# Patient Record
Sex: FEMALE | Race: WHITE
[De-identification: names, ages, dates, MRNs, and addresses within clinical notes are randomized per-mention and may not be internally consistent; named-entity substitution may affect disease eponyms.]

---

## 2017-10-17 ENCOUNTER — HOSPITAL ENCOUNTER (EMERGENCY)
Dept: HOSPITAL 80 - FED | Age: 77
Discharge: HOME | End: 2017-10-17
Payer: COMMERCIAL

## 2017-10-17 VITALS
HEART RATE: 107 BPM | RESPIRATION RATE: 20 BRPM | SYSTOLIC BLOOD PRESSURE: 130 MMHG | DIASTOLIC BLOOD PRESSURE: 60 MMHG | OXYGEN SATURATION: 88 %

## 2017-10-17 VITALS — TEMPERATURE: 98.4 F

## 2017-10-17 DIAGNOSIS — Z79.82: ICD-10-CM

## 2017-10-17 DIAGNOSIS — E87.1: Primary | ICD-10-CM

## 2017-10-17 DIAGNOSIS — E86.9: ICD-10-CM

## 2017-10-17 DIAGNOSIS — I10: ICD-10-CM

## 2017-10-17 DIAGNOSIS — Z87.891: ICD-10-CM

## 2017-10-17 DIAGNOSIS — E86.0: ICD-10-CM

## 2017-10-17 DIAGNOSIS — Z85.3: ICD-10-CM

## 2017-10-17 LAB
% IMMATURE GRANULYOCYTES: 0.2 % (ref 0–1.1)
ABSOLUTE IMMATURE GRANULOCYTES: 0.01 10^3/UL (ref 0–0.1)
ABSOLUTE NRBC COUNT: 0 10^3/UL (ref 0–0.01)
ADD DIFF?: NO
ADD MORPH?: NO
ADD SCAN?: NO
ALBUMIN SERPL-MCNC: 3.3 G/DL (ref 3.5–5)
ALP SERPL-CCNC: 95 IU/L (ref 38–126)
ALT SERPL-CCNC: 94 IU/L (ref 9–52)
ANION GAP SERPL CALC-SCNC: 8 MEQ/L (ref 8–16)
AST SERPL-CCNC: 81 IU/L (ref 14–46)
ATYPICAL LYMPHOCYTE FLAG: 0 (ref 0–99)
BILIRUB SERPL-MCNC: 1 MG/DL (ref 0.1–1.4)
BILIRUBIN-CONJUGATED: 0.4 MG/DL (ref 0–0.5)
BILIRUBIN-UNCONJUGATED: 0.6 MG/DL (ref 0–1.1)
CALCIUM SERPL-MCNC: 9.4 MG/DL (ref 8.5–10.4)
CHLORIDE SERPL-SCNC: 94 MEQ/L (ref 97–110)
CO2 SERPL-SCNC: 26 MEQ/L (ref 22–31)
CREAT SERPL-MCNC: 1 MG/DL (ref 0.6–1)
ERYTHROCYTE [DISTWIDTH] IN BLOOD BY AUTOMATED COUNT: 12.8 % (ref 11.5–15.2)
FRAGMENT RBC FLAG: 0 (ref 0–99)
GFR SERPL CREATININE-BSD FRML MDRD: 54 ML/MIN/{1.73_M2}
GLUCOSE SERPL-MCNC: 118 MG/DL (ref 70–100)
HCT VFR BLD CALC: 42.1 % (ref 38–47)
HGB BLD-MCNC: 14.8 G/DL (ref 12.6–16.3)
LEFT SHIFT FLG: 0 (ref 0–99)
LIPEMIA HEMOLYSIS FLAG: 90 (ref 0–99)
MCH RBC BLDCO QN: 31.2 PG (ref 27.9–34.1)
MCHC RBC AUTO-ENTMCNC: 35.2 G/DL (ref 32.4–36.7)
MCV RBC AUTO: 88.8 FL (ref 81.5–99.8)
MUCOUS THREADS #/AREA URNS LPF: (no result) /LPF
NRBC-AUTO%: 0 % (ref 0–0.2)
PLATELET # BLD: 222 10^3/UL (ref 150–400)
PLATELET CLUMPS FLAG: 0 (ref 0–99)
PMV BLD AUTO: 10 FL (ref 8.7–11.7)
POTASSIUM SERPL-SCNC: 3.6 MEQ/L (ref 3.5–5.2)
PROT SERPL-MCNC: 6.2 G/DL (ref 6.3–8.2)
RBC # BLD AUTO: 4.74 10^6/UL (ref 4.18–5.33)
RBC #/AREA URNS HPF: (no result) /HPF (ref 0–3)
SODIUM SERPL-SCNC: 128 MEQ/L (ref 134–144)
WBC #/AREA URNS HPF: (no result) /HPF (ref 0–3)

## 2017-10-17 PROCEDURE — 96374 THER/PROPH/DIAG INJ IV PUSH: CPT

## 2017-10-17 PROCEDURE — 99284 EMERGENCY DEPT VISIT MOD MDM: CPT

## 2017-10-17 NOTE — EDPHY
H & P


Stated Complaint: Nausea, abdo pain, diarrhea and rash - Possible reaction to 

chemo med.


Time Seen by Provider: 10/17/17 12:09


HPI/ROS: 





CHIEF COMPLAINT:  Weakness, loss of appetite, history of metastatic breast 

cancer





HISTORY OF PRESENT ILLNESS:  The patient presents to the ED with worsening 

weakness, loss of appetite, intermittent diarrhea and constipation.  The 

patient is currently receiving oral chemotherapy for metastatic breast cancer.  

The patient is on Atenafor and Examestone.  Additionally she takes losartan, 

Synthroid and Prilosec.  The patient has been on her oral chemotherapy for 

approximately the past 6 weeks.  She recently return to [place] from [place] 

where she did not have medical care.  She reportedly had a fever last night to 

102 degrees.  The patient denies acute cough.  She complains of generalized 

weakness and loss of appetite as her primary presenting symptom today.





REVIEW OF SYSTEMS:


A comprehensive 10 point review of systems is otherwise negative aside from 

elements mentioned in the history of present illness.


Source: Patient


Exam Limitations: No limitations





- Personal History


Current Tetanus Diphtheria and Acellular Pertussis (TDAP): Unsure





- Medical/Surgical History


Hx Asthma: No


Hx Chronic Respiratory Disease: No


Hx Diabetes: No


Hx Cardiac Disease: No


Hx Renal Disease: No


Hx Cirrhosis: No


Hx Alcoholism: No


Hx HIV/AIDS: No


Hx Splenectomy or Spleen Trauma: No


Other PMH: HTN, Hysterectomy, breast augmentation - Breast CA





- Social History


Smoking Status: Former smoker





- Physical Exam


Exam: 





General Appearance:  Alert, no distress


Eyes:  Pupils equal and round no pallor or injection


ENT, Mouth:  Mucous membranes moist


Respiratory:  There are no retractions, lungs are clear to auscultation


Cardiovascular:  Regular rate and rhythm


Gastrointestinal:  Abdomen is soft and nontender, no masses, bowel sounds normal


Neurological:  A&O, normal motor function, normal sensory exam, normal cranial 

nerves


Skin:  Warm and dry, no rashes


Musculoskeletal:  Neck is supple nontender


Extremities:  symmetrical, full range of motion








Constitutional: 


 Initial Vital Signs











Temperature (C)  36.9 C   10/17/17 11:42


 


Heart Rate  118 H  10/17/17 11:42


 


Respiratory Rate  18   10/17/17 11:42


 


Blood Pressure  128/82 H  10/17/17 11:42


 


O2 Sat (%)  92   10/17/17 11:42








 











O2 Delivery Mode               Room Air














Allergies/Adverse Reactions: 


 





iopamidol [From Isovue-M] Allergy (Severe, Verified 08/20/15 18:04)


 Anaphylaxis


lisinopril [Lisinopril] Allergy (Severe, Verified 08/20/15 18:04)


 Swelling/neck,face,throat


azithromycin [From Zithromax Z-Tomas] Allergy (Intermediate, Verified 08/20/15 18:

04)


 Rash


codeine [Codeine] Allergy (Intermediate, Verified 08/20/15 18:04)


 Vomiting


erythromycin base [Erythromycin Base] Allergy (Intermediate, Verified 08/20/15 

18:04)


 Other-Enter Comments


meperidine HCl [From Demerol] Allergy (Intermediate, Verified 08/20/15 18:04)


 Vomiting


Penicillins Allergy (Intermediate, Verified 08/20/15 18:04)


 Hives








Home Medications: 














 Medication  Instructions  Recorded


 


Ascorbic Acid [Vitamin C 500 mg 1,000 mg PO DAILY 10/21/14





(*)]  


 


Aspirin [Aspirin 81mg (*)] 81 mg PO DAILY 10/21/14


 


Fexofenadine HCl [Allegra Allergy] 60 mg PO DAILY PRN 10/21/14


 


Herbals/Supplements -Info Only 1 ea PO DAILY 10/21/14


 


Levothyroxine [Synthroid 112 mcg 112 mcg PO DAILY06 10/21/14





(*)]  


 


Magnesium Oxide [Magnesium Oxide 200 mg PO DAILY 10/21/14





400 mg (*)]  


 


Multivitamins [Multivitamin (*)] 1 each PO DAILY 10/21/14


 


Omega-3 Fatty Acids [Fish Oil 1000 1,000 mg PO DAILY 10/21/14





mg (*)]  


 


Calcium Carbonate [Oyster Shell 500 mg PO DAILY 08/20/15





Calcium 500 mg (*)]  


 


Cholecalciferol (Vitamin D3) 400 unit PO DAILY 08/20/15





[Delta D3]  


 


Ibuprofen [Motrin (*)] 600 mg PO TID PRN 08/20/15


 


Letrozole [Femara 2.5 mg (*)] 2.5 mg PO DAILY 08/20/15


 


Losartan Potassium [Cozaar] 100 mg PO DAILY 08/20/15


 


Ondansetron Odt [Zofran Odt] 4 mg PO Q4PRN PRN #20 tab 10/17/17














Medical Decision Making


ED Course/Re-evaluation: 





I reviewed the patient's past medical records.  She presents to the ED with 

symptoms consistent with dehydration with fatigue and loss of appetite.  The 

patient did report a fever yesterday to 102 degrees.  She is afebrile in the 

emergency department today.  The patient's vital signs are stable.  She is not 

hypoxemic.  She has no acute respiratory complaints.  The patient's CBC is 

within normal limits without evidence of neutropenia.  The patient did have 

blood cultures x2 obtained.  She was noted to have mild hyponatremia consistent 

with her clinical dehydration.





The patient had an IV established.  She received 2 L of normal saline and 4 mg 

of IV Zofran.





I re-evaluated the patient at 1:50 p.m..  She is feeling better.  We are still 

awaiting urinalysis.  The patient's venous lactate is normal.





Patient's urinalysis is obtained demonstrates no evidence of an infection.





I spoke with her oncologist at the [facility].  He recommends 

having her withhold her oral chemo medications.  They will follow up with her 

later this week.  The patient will be discharged home with customary aftercare 

instructions and return precautions.








Differential Diagnosis: 





Differential diagnosis considered includes neutropenic fever, dehydration, 

metabolic abnormality, urinary tract infection, medication side effect





- Data Points


Laboratory Results: 


 Laboratory Results





 10/17/17 12:35 





 10/17/17 12:35 





 











  10/17/17 10/17/17 10/17/17





  14:20 12:35 12:35


 


WBC    6.09 10^3/uL 10^3/uL  





    (3.80-9.50)  


 


RBC    4.74 10^6/uL 10^6/uL  





    (4.18-5.33)  


 


Hgb    14.8 g/dL g/dL  





    (12.6-16.3)  


 


Hct    42.1 % %  





    (38.0-47.0)  


 


MCV    88.8 fL fL  





    (81.5-99.8)  


 


MCH    31.2 pg pg  





    (27.9-34.1)  


 


MCHC    35.2 g/dL g/dL  





    (32.4-36.7)  


 


RDW    12.8 % %  





    (11.5-15.2)  


 


Plt Count    222 10^3/uL 10^3/uL  





    (150-400)  


 


MPV    10.0 fL fL  





    (8.7-11.7)  


 


Neut % (Auto)    63.3 % %  





    (39.3-74.2)  


 


Lymph % (Auto)    19.5 % %  





    (15.0-45.0)  


 


Mono % (Auto)    11.5 % %  





    (4.5-13.0)  


 


Eos % (Auto)    4.4 % %  





    (0.6-7.6)  


 


Baso % (Auto)    1.1 % %  





    (0.3-1.7)  


 


Nucleat RBC Rel Count    0.0 % %  





    (0.0-0.2)  


 


Absolute Neuts (auto)    3.85 10^3/uL 10^3/uL  





    (1.70-6.50)  


 


Absolute Lymphs (auto)    1.19 10^3/uL 10^3/uL  





    (1.00-3.00)  


 


Absolute Monos (auto)    0.70 10^3/uL 10^3/uL  





    (0.30-0.80)  


 


Absolute Eos (auto)    0.27 10^3/uL 10^3/uL  





    (0.03-0.40)  


 


Absolute Basos (auto)    0.07 10^3/uL 10^3/uL  





    (0.02-0.10)  


 


Absolute Nucleated RBC    0.00 10^3/uL 10^3/uL  





    (0-0.01)  


 


Immature Gran %    0.2 % %  





    (0.0-1.1)  


 


Immature Gran #    0.01 10^3/uL 10^3/uL  





    (0.00-0.10)  


 


VBG Lactic Acid      





    


 


Sodium      128 mEq/L L mEq/L





     (134-144) 


 


Potassium      3.6 mEq/L mEq/L





     (3.5-5.2) 


 


Chloride      94 mEq/L L mEq/L





     () 


 


Carbon Dioxide      26 mEq/l mEq/l





     (22-31) 


 


Anion Gap      8 mEq/L mEq/L





     (8-16) 


 


BUN      11 mg/dL mg/dL





     (7-23) 


 


Creatinine      1.0 mg/dL mg/dL





     (0.6-1.0) 


 


Estimated GFR      54 





    


 


Glucose      118 mg/dL H mg/dL





     () 


 


Calcium      9.4 mg/dL mg/dL





     (8.5-10.4) 


 


Total Bilirubin      1.0 mg/dL mg/dL





     (0.1-1.4) 


 


Conjugated Bilirubin      0.4 mg/dL mg/dL





     (0.0-0.5) 


 


Unconjugated Bilirubin      0.6 mg/dL mg/dL





     (0.0-1.1) 


 


AST      81 IU/L H IU/L





     (14-46) 


 


ALT      94 IU/L H IU/L





     (9-52) 


 


Alkaline Phosphatase      95 IU/L IU/L





     () 


 


Total Protein      6.2 g/dL L g/dL





     (6.3-8.2) 


 


Albumin      3.3 g/dL L g/dL





     (3.5-5.0) 


 


Urine RBC  25-50 /hpf H /hpf    





   (0-3)   


 


Urine WBC  1-3 /hpf /hpf    





   (0-3)   


 


Ur Epithelial Cells  NONE SEEN /lpf /lpf    





   (NONE-1+)   


 


Urine Mucus  TRACE /lpf /lpf    





   (NONE-1+)   














  10/17/17





  12:33


 


WBC  





  


 


RBC  





  


 


Hgb  





  


 


Hct  





  


 


MCV  





  


 


MCH  





  


 


MCHC  





  


 


RDW  





  


 


Plt Count  





  


 


MPV  





  


 


Neut % (Auto)  





  


 


Lymph % (Auto)  





  


 


Mono % (Auto)  





  


 


Eos % (Auto)  





  


 


Baso % (Auto)  





  


 


Nucleat RBC Rel Count  





  


 


Absolute Neuts (auto)  





  


 


Absolute Lymphs (auto)  





  


 


Absolute Monos (auto)  





  


 


Absolute Eos (auto)  





  


 


Absolute Basos (auto)  





  


 


Absolute Nucleated RBC  





  


 


Immature Gran %  





  


 


Immature Gran #  





  


 


VBG Lactic Acid  1.4 mmol/L mmol/L





   (0.7-2.1) 


 


Sodium  





  


 


Potassium  





  


 


Chloride  





  


 


Carbon Dioxide  





  


 


Anion Gap  





  


 


BUN  





  


 


Creatinine  





  


 


Estimated GFR  





  


 


Glucose  





  


 


Calcium  





  


 


Total Bilirubin  





  


 


Conjugated Bilirubin  





  


 


Unconjugated Bilirubin  





  


 


AST  





  


 


ALT  





  


 


Alkaline Phosphatase  





  


 


Total Protein  





  


 


Albumin  





  


 


Urine RBC  





  


 


Urine WBC  





  


 


Ur Epithelial Cells  





  


 


Urine Mucus  





  











Medications Given: 


 








Discontinued Medications





Sodium Chloride (Ns)  1,000 mls @ 0 mls/hr IV ONCE ONE; Wide Open


   PRN Reason: Protocol


   Stop: 10/17/17 12:21


   Last Admin: 10/17/17 12:40 Dose:  1,000 mls


Ondansetron HCl (Zofran)  4 mg IVP EDNOW ONE


   Stop: 10/17/17 12:36


   Last Admin: 10/17/17 12:40 Dose:  4 mg








Departure





- Departure


Disposition: Home, Routine, Self-Care


Clinical Impression: 


 BREAST CANCER, BILATERAL, Hyponatremia, Dehydration





Condition: Good


Instructions:  Dehydration (ED)


Additional Instructions: 


1.  Zofran as needed for nausea.


2. Return to the ED for any worsening symptoms, high fever, acute pain or other 

concerns.


3. Your oncologist does recommend holding her oral chemotherapy medications.  

Please contact their office to discuss resuming these medications.








Referrals: 


Jaziel Oliveira MD [Primary Care Provider] - As per Instructions

## 2017-10-24 ENCOUNTER — HOSPITAL ENCOUNTER (OUTPATIENT)
Dept: HOSPITAL 80 - FED | Age: 77
Setting detail: OBSERVATION
LOS: 1 days | Discharge: HOME | End: 2017-10-25
Attending: INTERNAL MEDICINE | Admitting: INTERNAL MEDICINE
Payer: COMMERCIAL

## 2017-10-24 DIAGNOSIS — T45.1X5D: ICD-10-CM

## 2017-10-24 DIAGNOSIS — J90: ICD-10-CM

## 2017-10-24 DIAGNOSIS — C50.919: ICD-10-CM

## 2017-10-24 DIAGNOSIS — E86.0: ICD-10-CM

## 2017-10-24 DIAGNOSIS — Z82.49: ICD-10-CM

## 2017-10-24 DIAGNOSIS — Z88.0: ICD-10-CM

## 2017-10-24 DIAGNOSIS — R53.83: Primary | ICD-10-CM

## 2017-10-24 DIAGNOSIS — R74.0: ICD-10-CM

## 2017-10-24 DIAGNOSIS — E87.1: ICD-10-CM

## 2017-10-24 DIAGNOSIS — E03.9: ICD-10-CM

## 2017-10-24 DIAGNOSIS — Z91.041: ICD-10-CM

## 2017-10-24 DIAGNOSIS — Z87.891: ICD-10-CM

## 2017-10-24 DIAGNOSIS — J96.01: ICD-10-CM

## 2017-10-24 DIAGNOSIS — I10: ICD-10-CM

## 2017-10-24 DIAGNOSIS — R79.9: ICD-10-CM

## 2017-10-24 LAB
% IMMATURE GRANULYOCYTES: 0.9 % (ref 0–1.1)
ABSOLUTE IMMATURE GRANULOCYTES: 0.06 10^3/UL (ref 0–0.1)
ABSOLUTE NRBC COUNT: 0.11 10^3/UL (ref 0–0.01)
ADD DIFF?: NO
ADD MORPH?: YES
ADD SCAN?: NO
ALBUMIN SERPL-MCNC: 3 G/DL (ref 3.5–5)
ALP SERPL-CCNC: 98 IU/L (ref 38–126)
ALT SERPL-CCNC: 64 IU/L (ref 9–52)
ANION GAP SERPL CALC-SCNC: 10 MEQ/L (ref 8–16)
AST SERPL-CCNC: 53 IU/L (ref 14–46)
ATYPICAL LYMPHOCYTE FLAG: 0 (ref 0–99)
BILIRUB SERPL-MCNC: 1.2 MG/DL (ref 0.1–1.4)
CALCIUM SERPL-MCNC: 8.4 MG/DL (ref 8.5–10.4)
CHLORIDE SERPL-SCNC: 91 MEQ/L (ref 97–110)
CO2 SERPL-SCNC: 27 MEQ/L (ref 22–31)
CREAT SERPL-MCNC: 0.9 MG/DL (ref 0.6–1)
ERYTHROCYTE [DISTWIDTH] IN BLOOD BY AUTOMATED COUNT: 13.2 % (ref 11.5–15.2)
FRAGMENT RBC FLAG: 0 (ref 0–99)
GFR SERPL CREATININE-BSD FRML MDRD: > 60 ML/MIN/{1.73_M2}
GLUCOSE SERPL-MCNC: 132 MG/DL (ref 70–100)
HCT VFR BLD CALC: 36.2 % (ref 38–47)
HGB BLD-MCNC: 12.9 G/DL (ref 12.6–16.3)
LEFT SHIFT FLG: 0 (ref 0–99)
LIPEMIA HEMOLYSIS FLAG: 90 (ref 0–99)
MCH RBC BLDCO QN: 31.1 PG (ref 27.9–34.1)
MCHC RBC AUTO-ENTMCNC: 35.6 G/DL (ref 32.4–36.7)
MCV RBC AUTO: 87.2 FL (ref 81.5–99.8)
NRBC-AUTO%: 1.7 % (ref 0–0.2)
PLATELET # BLD: 289 10^3/UL (ref 150–400)
PLATELET CLUMPS FLAG: 0 (ref 0–99)
PMV BLD AUTO: 9.8 FL (ref 8.7–11.7)
POTASSIUM SERPL-SCNC: 3.4 MEQ/L (ref 3.5–5.2)
PROT SERPL-MCNC: 5.2 G/DL (ref 6.3–8.2)
RBC # BLD AUTO: 4.15 10^6/UL (ref 4.18–5.33)
SODIUM SERPL-SCNC: 128 MEQ/L (ref 134–144)

## 2017-10-24 PROCEDURE — G0378 HOSPITAL OBSERVATION PER HR: HCPCS

## 2017-10-24 PROCEDURE — A9540 TC99M MAA: HCPCS

## 2017-10-24 PROCEDURE — 93005 ELECTROCARDIOGRAM TRACING: CPT

## 2017-10-24 PROCEDURE — 78582 LUNG VENTILAT&PERFUS IMAGING: CPT

## 2017-10-24 PROCEDURE — A9558 XE133 XENON 10MCI: HCPCS

## 2017-10-24 PROCEDURE — 99285 EMERGENCY DEPT VISIT HI MDM: CPT

## 2017-10-24 PROCEDURE — 71020: CPT

## 2017-10-24 PROCEDURE — 93970 EXTREMITY STUDY: CPT

## 2017-10-24 PROCEDURE — 93306 TTE W/DOPPLER COMPLETE: CPT

## 2017-10-24 NOTE — CPEKG
Heart Rate: 97

RR Interval: 619

P-R Interval: 144

QRSD Interval: 86

QT Interval: 348

QTC Interval: 442

P Axis: 77

QRS Axis: 55

T Wave Axis: 39

EKG Severity - BORDERLINE ECG -

EKG Impression: SINUS RHYTHM

EKG Impression: LOW VOLTAGE THROUGHOUT

EKG Impression: BORDERLINE T ABNORMALITIES, ANTERIOR LEADS

Electronically Signed By: Anjel Garcia 25-Oct-2017 06:24:23

## 2017-10-25 VITALS — SYSTOLIC BLOOD PRESSURE: 118 MMHG | TEMPERATURE: 98.6 F | DIASTOLIC BLOOD PRESSURE: 80 MMHG

## 2017-10-25 VITALS — RESPIRATION RATE: 20 BRPM

## 2017-10-25 VITALS — HEART RATE: 84 BPM | OXYGEN SATURATION: 95 %

## 2017-10-25 LAB
% IMMATURE GRANULYOCYTES: 1 % (ref 0–1.1)
ABSOLUTE IMMATURE GRANULOCYTES: 0.06 10^3/UL (ref 0–0.1)
ABSOLUTE NRBC COUNT: 0.1 10^3/UL (ref 0–0.01)
ADD DIFF?: NO
ADD MORPH?: YES
ADD SCAN?: NO
ANION GAP SERPL CALC-SCNC: 9 MEQ/L (ref 8–16)
ATYPICAL LYMPHOCYTE FLAG: 0 (ref 0–99)
CALCIUM SERPL-MCNC: 8.4 MG/DL (ref 8.5–10.4)
CHLORIDE SERPL-SCNC: 95 MEQ/L (ref 97–110)
CO2 SERPL-SCNC: 28 MEQ/L (ref 22–31)
CREAT SERPL-MCNC: 0.9 MG/DL (ref 0.6–1)
ERYTHROCYTE [DISTWIDTH] IN BLOOD BY AUTOMATED COUNT: 13.2 % (ref 11.5–15.2)
FRAGMENT RBC FLAG: 0 (ref 0–99)
GFR SERPL CREATININE-BSD FRML MDRD: > 60 ML/MIN/{1.73_M2}
GLUCOSE SERPL-MCNC: 98 MG/DL (ref 70–100)
HCT VFR BLD CALC: 35.2 % (ref 38–47)
HGB BLD-MCNC: 12.3 G/DL (ref 12.6–16.3)
LEFT SHIFT FLG: 0 (ref 0–99)
LIPEMIA HEMOLYSIS FLAG: 90 (ref 0–99)
MCH RBC BLDCO QN: 30.9 PG (ref 27.9–34.1)
MCHC RBC AUTO-ENTMCNC: 34.9 G/DL (ref 32.4–36.7)
MCV RBC AUTO: 88.4 FL (ref 81.5–99.8)
MICROCYTES: (no result)
NRBC-AUTO%: 1.7 % (ref 0–0.2)
PLATELET # BLD EST: ADEQUATE 10*3/UL
PLATELET # BLD EST: ADEQUATE 10*3/UL
PLATELET # BLD: 266 10^3/UL (ref 150–400)
PLATELET CLUMPS FLAG: 20 (ref 0–99)
PMV BLD AUTO: 9.8 FL (ref 8.7–11.7)
POLYCHROMASIA BLD QL SMEAR: (no result)
POLYCHROMASIA BLD QL SMEAR: (no result)
POTASSIUM SERPL-SCNC: 4.2 MEQ/L (ref 3.5–5.2)
RBC # BLD AUTO: 3.98 10^6/UL (ref 4.18–5.33)
SODIUM SERPL-SCNC: 132 MEQ/L (ref 134–144)
TROPONIN I SERPL-MCNC: 0.06 NG/ML (ref 0–0.03)
TROPONIN I SERPL-MCNC: 0.06 NG/ML (ref 0–0.03)

## 2017-10-25 NOTE — PDGENHP
History and Physical





- Chief Complaint


Fatigue





- History of Present Illness


76 yo F w/ metastatic breast CA, hypothyroid, and HTN presents with fatigue and 

SOB. Patient states she started two new medications for breast CA (Afinitor and 

Exemestane) about 2 months ago. One month ago she developed fatigue and stomach 

pain. As a  result, the Afinitor was stopped 1 week ago as this was felt to be 

a drug side effect. During the last week she has continued to feel fatigued and 

also has began to notice intermittent shortness of breath improved by 

albuterol. It was the progressive fatigue that led her to come to the ED today. 

She denies chest pain, fevers, and chills. She has continued to have poor PO 

intake.





History Information





- Allergies/Home Medication List


Allergies/Adverse Reactions: 








iopamidol [From Isovue-M] Allergy (Severe, Verified 10/24/17 23:01)


 Anaphylaxis


lisinopril [Lisinopril] Allergy (Severe, Verified 10/24/17 23:01)


 Swelling/neck,face,throat


azithromycin [From Zithromax Z-Tomas] Allergy (Intermediate, Verified 10/24/17 23:

01)


 Rash


codeine [Codeine] Allergy (Intermediate, Verified 10/24/17 23:01)


 Vomiting


erythromycin base [Erythromycin Base] Allergy (Intermediate, Verified 10/24/17 

23:01)


 Other-Enter Comments


meperidine HCl [From Demerol] Allergy (Intermediate, Verified 10/24/17 23:01)


 Vomiting


Penicillins Allergy (Intermediate, Verified 10/24/17 23:01)


 Hives


Iodine and Iodide Containing Produc Allergy (Verified 10/24/17 23:02)


 





Home Medications: 








Ascorbic Acid [Vitamin C 500 mg (*)] 1,000 mg PO DAILY 10/21/14 [Last Taken 08/

20/15]


Aspirin [Aspirin 81mg (*)] 81 mg PO DAILY 10/21/14 [Last Taken 08/19/15]


Fexofenadine HCl [Allegra Allergy] 60 mg PO DAILY PRN 10/21/14 [Last Taken 08/20

/15]


Herbals/Supplements -Info Only 1 ea PO DAILY 10/21/14 [Last Taken 10/27/14]


Levothyroxine [Synthroid 112 mcg (*)] 112 mcg PO DAILY06 10/21/14 [Last Taken 08

/20/15]


Magnesium Oxide [Magnesium Oxide 400 mg (*)] 200 mg PO DAILY 10/21/14 [Last 

Taken 08/19/15]


Multivitamins [Multivitamin (*)] 1 each PO DAILY 10/21/14 [Last Taken 08/20/15]


Omega-3 Fatty Acids [Fish Oil 1000 mg (*)] 1,000 mg PO DAILY 10/21/14 [Last 

Taken 08/20/15]


Calcium Carbonate [Oyster Shell Calcium 500 mg (*)] 500 mg PO DAILY 08/20/15 [

Last Taken 08/19/15]


Cholecalciferol (Vitamin D3) [Delta D3] 400 unit PO DAILY 08/20/15 [Last Taken 

08/19/15]


Ibuprofen [Motrin (*)] 600 mg PO TID PRN 08/20/15 [Last Taken Unknown]


Letrozole [Femara 2.5 mg (*)] 2.5 mg PO DAILY 08/20/15 [Last Taken 08/20/15]


Losartan Potassium [Cozaar] 100 mg PO DAILY 08/20/15 [Last Taken 08/20/15]





I have personally reviewed and updated: family history, medical history





- Past Medical History


cancer (Metastatic breast (bone, liver)), hypertension





- Family History


Positive for: CAD


Additional family history: UC





- Social History


Smoking Status: Former smoker





Review of Systems


Review of Systems: 





ROS: 10pt was reviewed & negative except for what was stated in HPI & below





Physical Exam


Physical Exam: 

















Temp Pulse Resp BP Pulse Ox


 


 37.9 C   90   22 H  132/84 H  95 


 


 10/24/17 22:57  10/25/17 01:28  10/25/17 01:28  10/25/17 01:28  10/25/17 01:28




















O2 (L/minute)                  2














Constitutional: no apparent distress, appears nourished


Eyes: PERRL, EOMI


Ears, Nose, Mouth, Throat: moist mucous membranes, no oral mucosal ulcers


Cardiovascular: regular rate and rhythym, no murmur, rub, or gallop


Respiratory: no respiratory distress, clear to auscultation


Skin: warm, normal color


Musculoskeletal: full muscle strength, no muscle tenderness


Neurologic: AAOx3, CN II-XII Intact


Psychiatric: interacting appropriately, not anxious





Lab Data & Imaging Review





 10/24/17 23:33





 10/24/17 23:33














WBC  6.51 10^3/uL (3.80-9.50)   10/24/17  23:33    


 


RBC  4.15 10^6/uL (4.18-5.33)  L  10/24/17  23:33    


 


Hgb  12.9 g/dL (12.6-16.3)   10/24/17  23:33    


 


Hct  36.2 % (38.0-47.0)  L  10/24/17  23:33    


 


MCV  87.2 fL (81.5-99.8)   10/24/17  23:33    


 


MCH  31.1 pg (27.9-34.1)   10/24/17  23:33    


 


MCHC  35.6 g/dL (32.4-36.7)   10/24/17  23:33    


 


RDW  13.2 % (11.5-15.2)   10/24/17  23:33    


 


Plt Count  289 10^3/uL (150-400)   10/24/17  23:33    


 


MPV  9.8 fL (8.7-11.7)   10/24/17  23:33    


 


Neut % (Auto)  60.5 % (39.3-74.2)   10/24/17  23:33    


 


Lymph % (Auto)  18.6 % (15.0-45.0)   10/24/17  23:33    


 


Mono % (Auto)  13.5 % (4.5-13.0)  H  10/24/17  23:33    


 


Eos % (Auto)  6.0 % (0.6-7.6)   10/24/17  23:33    


 


Baso % (Auto)  0.5 % (0.3-1.7)   10/24/17  23:33    


 


Nucleat RBC Rel Count  1.7 % (0.0-0.2)  H  10/24/17  23:33    


 


Absolute Neuts (auto)  3.94 10^3/uL (1.70-6.50)   10/24/17  23:33    


 


Absolute Lymphs (auto)  1.21 10^3/uL (1.00-3.00)   10/24/17  23:33    


 


Absolute Monos (auto)  0.88 10^3/uL (0.30-0.80)  H  10/24/17  23:33    


 


Absolute Eos (auto)  0.39 10^3/uL (0.03-0.40)   10/24/17  23:33    


 


Absolute Basos (auto)  0.03 10^3/uL (0.02-0.10)   10/24/17  23:33    


 


Absolute Nucleated RBC  0.11 10^3/uL (0-0.01)  H  10/24/17  23:33    


 


Immature Gran %  0.9 % (0.0-1.1)   10/24/17  23:33    


 


Immature Gran #  0.06 10^3/uL (0.00-0.10)   10/24/17  23:33    


 


Platelet Estimate  ADEQUATE  (ADEQ)   10/24/17  23:33    


 


Polychromasia  1+  H  10/24/17  23:33    


 


D-Dimer  3.84 ug/mLFEU (0.00-0.50)  H  10/24/17  23:33    


 


Sodium  128 mEq/L (134-144)  L  10/24/17  23:33    


 


Potassium  3.4 mEq/L (3.5-5.2)  L  10/24/17  23:33    


 


Chloride  91 mEq/L ()  L  10/24/17  23:33    


 


Carbon Dioxide  27 mEq/l (22-31)   10/24/17  23:33    


 


Anion Gap  10 mEq/L (8-16)   10/24/17  23:33    


 


BUN  6 mg/dL (7-23)  L  10/24/17  23:33    


 


Creatinine  0.9 mg/dL (0.6-1.0)   10/24/17  23:33    


 


Estimated GFR  > 60   10/24/17  23:33    


 


Glucose  132 mg/dL ()  H  10/24/17  23:33    


 


Calcium  8.4 mg/dL (8.5-10.4)  L  10/24/17  23:33    


 


Total Bilirubin  1.2 mg/dL (0.1-1.4)   10/24/17  23:33    


 


AST  53 IU/L (14-46)  H  10/24/17  23:33    


 


ALT  64 IU/L (9-52)  H  10/24/17  23:33    


 


Alkaline Phosphatase  98 IU/L ()   10/24/17  23:33    


 


Troponin I  0.064 ng/mL (0.000-0.034)  H  10/24/17  23:33    


 


Total Protein  5.2 g/dL (6.3-8.2)  L  10/24/17  23:33    


 


Albumin  3.0 g/dL (3.5-5.0)  L  10/24/17  23:33    


 


Lipase  81 IU/L ()   10/24/17  23:33    








Imaging Review: 


B/l LE U/S without evidence of DVT. CXR with small b/l pleural effusions and 

atelectasis.


Visualized and Interpreted EKG results: Yes


EKG Interpretation: Positive for: normal sinsus rhythm, T waves inversion (V1-V3

)





Assessment & Plan


Assessment: 








76 yo F w/ hypothyroid, HTN, and metastatic breast CA presents with subacute 

fatigue and SOB.








Plan: 


1. Subacute fatigue, SOB - Patient states that her symptoms started a few weeks 

after starting new cancer medications (Afinitor, Exemestane), but have worsened 

over the last week despite stopping Afinitor. Fatigue is listed on both these 

medications side effects, which could be explanatory in addition to 

contributions from hyponatremia and poor PO intake. However, noting mild hypoxia

, small pleural effusions, and elevated D-dimer, reasonable to investigate 

other etiologies such as heart failure (low incidence Exemestane side-effect) 

and PE (she has contrast allergy, b/l LE US negative).


- Gentle IVF to treat dehydration


- TTE to evaluate cardiac function


- VQ scan ordered to evaluate for PE noting contrast allergy, will hold off on 

empiric anticoagulation noting clinical stability


- PT, OT, nutrition consults


- Check TSH


2. AHRF - Mild, requiring 2 L/min via NC to maintain O2 sats >90%. Could simply 

be 2/2 atelectasis in setting of recent poor mobility, but will investigate 

other etiologies as above with TTE and VQ scan.


3. Hyponatremia - Has prior hx of this, with most recent value of 128 one week 

prior to admission. I suspect at least some hypovolemic component noting poor 

PO intake.


- 500 mL NS and recheck BMP in AM


- Will check Lizzie and OSMs


4. Transaminitis -  Mild, suspect related to new medications vs. metastases.


5. Hypothyroid - Will check TSH


6. HTN - On Losartan as outpatient.





Diet - Regular


Code - Full


Ppx - SCDs


Dispo - Admit to observation status

## 2017-10-25 NOTE — GDS
[f 
rep st]



                                                             DISCHARGE SUMMARY





DISCHARGE DIAGNOSES:  

1.  Fatigue with shortness of breath.

2.  Acute hypoxemic respiratory failure.

3.  Hyponatremia.

4.  Elevated troponin.

5.  Transaminitis.

6.  Hypothyroidism.

7.  Hypertension.



HISTORY:  The patient is a 77-year-old female with history of metastatic breast 
cancer, hypothyroidism, and hypertension.  She presented to the emergency room 
with fatigue and shortness of breath.  She started 2 medications for breast 
cancer about 2 months ago.  One month ago, she developed fatigue and stomach 
pain.  As a result, the Afinitor was stopped 1 week ago, as this was felt to be 
a side effect of the drug.  During the last week, she continued to feel 
fatigued and she also noticed intermittent shortness of breath.  She was 
admitted for further evaluation.  She had an elevated D-dimer.  She is severely 
allergic to the dye used in the CTA.  A ventilation perfusion lung scan was 
performed which showed low probability for a pulmonary artery embolism.  An 
echocardiogram was performed, which noted that her EF is 70%-75%.  She has no 
regional wall abnormality.  She has diastolic dysfunction.  She has a posterior 
mitral leaflet that is calcified.  She has a small to moderate pericardial 
effusion.  She has slight RA collapse.  In addition she had an ultrasound 
performed of her lower extremities. This was negative for DVT.  Today, she is 
feeling better.  She is anxious to return back to her home.  She lives above 
the [facility].  Her oxygen levels are low with activity.  She will be 
discharged home on oxygen.  Will have her follow up with her primary care 
provider for further evaluation.



HOSPITAL COURSE BY PROBLEM:  

1.  Fatigue with shortness of breath.  I suspect mainly this is secondary to 
bilateral pleural effusions.  In addition, she has a remote history of smoking.
  She could have developed some underlying COPD which would need further 
evaluation.

2.  Acute hypoxemic respiratory failure.  This is mild.  She has been requiring 
2 L.  It could be also secondary to atelectasis.  V/Q scan does not indicate PE.

3.  Hyponatremia.  Sodium level is 132.  This improved with hydration.  She is 
feeling markedly better.  She suspects that she may be dehydrated.

4.  Elevation of troponin. This is likely demand ischemia due to being hypoxic.
  She has no chest pain.  EKG is sinus rhythm.  Echocardiogram does not show 
anything acute.

5.  Transaminitis.  This is possibly related to her new medications.  Further 
follow up with the PCP.

6.  Hypothyroidism.  TSH is stable.

7.  Hypertension.  Blood pressure is stable.



DISCHARGE CONDITION:  Stable.  Blood pressure is 112/66, O2 saturation on 2 L 
are 97%, respiratory rate is 20, heart rate is 87, temperature is 36.7 Celsius.



MEDICATIONS AT DISCHARGE:  Please see the EMR.



DISCHARGE INSTRUCTIONS:  

1.  Recommending she follow up with a cardiologist.  She will need a repeat 
echo to follow her pericardial effusion.

2.  Wear her oxygen around the clock.

3.  To get a repeat chest x-ray to be sure of resolution of the pericardial 
effusion.

4.  Recommend she take copies of her echo, V/Q scan and chest x-ray to follow 
up with her oncologist in the [place] area.





Job #:  [id number]

MTDD

## 2017-10-25 NOTE — PDHOMEO2F
Home Oxygen Face to Face


Home Orders: 


I certify that a physician or a nurse practitioner or physician's assistant has 

had a face-to-face encounter with this patient on the date of this order due to 

the diagnosis listed, which relates to the primary reason the patient requires 

home oxygen. Alternative treatments have been tried, or considered, and deemed 

ineffective. It is anticipated that supplemental oxygen will result in 

improvement with treatment.





Home oxygen qualifying diagnosis: bilateral pleural effusions


SpO2 on room air (%): 86%


Frequency of home oxygen needed: continuous


Home oxygen liters per minute: 2


Home oxygen delivery device: nasal cannula


Concentrator: Yes


E-tanks for mobility and back up: Yes


If ordering portable O2, is the patient mobile in the home?: Yes


I certify that, based on these findings, the home oxygen is medically necessary 

for this patient for the following length of time.





Length of time home oxygen needed: 99 years

## 2017-10-25 NOTE — EDPHY
H & P


Stated Complaint: SOB at night and weakness r


Time Seen by Provider: 10/24/17 23:10


HPI/ROS: 





Chief Complaint:  Fatigue, shortness of breath





HPI:  77-year-old woman with metastatic breast cancer currently under treatment 

at the [facility].  Patient is presenting with fatigue for the last 

3 weeks.  Also having significant weakness and shortness of breath for the last 

week or so.  She was seen in the emergency department last week.  At that time 

she has diagnosed with some dehydration is also thought that she could be 

having some reaction to her medication.  She was taken off of the Afinitor that 

is prescribed by her oncologist.  She is continuing to take the Exomestane.  

The patient was seen by her oncologist last week.  She is continuing to have 

worsening fatigue and is now having shortness of breath primarily at night.  

She has extreme fatigue walking to the bathroom and requires assistance getting 

back to bed.





ROS:  10 point Review of Systems is negative except as noted in the HPI.





PMH:  Metastatic breast cancer, hypertension, hypothyroidism





Social History: No smoking, rare alcohol,  no recreational drug use





Family History: non-contributory





Physical Exam:


Gen: Awake, Alert, No Distress


HEENT:  


     Nose: no rhinorrhea


     Eyes: PERRLA, EOMI


     Mouth: Moist mucosa 


Neck: Supple, no JVD


Chest: nontender, lungs clear to auscultation


Heart: S1, S2 normal, no murmur


Abd: Soft, non-tender, no guarding


Back: no CVA tenderness, no midline tenderness 


Ext: no edema, non-tender


Skin: no rash


Neuro: CN II-XII intact, Sensation grossly intact, Strength 5/5 in bilateral 

upper and lower extremities








- Personal History


Current Tetanus/Diphtheria Vaccine: Yes


Current Tetanus Diphtheria and Acellular Pertussis (TDAP): Yes





- Medical/Surgical History


Hx Asthma: No


Hx Chronic Respiratory Disease: No


Hx Diabetes: No


Hx Cardiac Disease: No


Hx Renal Disease: No


Hx Cirrhosis: No


Hx Alcoholism: No


Hx HIV/AIDS: No


Hx Splenectomy or Spleen Trauma: No


Other PMH: HTN, Hysterectomy, breast augmentation - Breast CA-mets to liver and 

bones, lymphedema





- Social History


Smoking Status: Former smoker


Constitutional: 


 Initial Vital Signs











Temperature (C)  37.9 C   10/24/17 22:57


 


Heart Rate  110 H  10/24/17 22:57


 


Respiratory Rate  18   10/24/17 22:57


 


Blood Pressure  128/70 H  10/24/17 22:57


 


O2 Sat (%)  90 L  10/24/17 22:57








 











O2 Delivery Mode               Room Air














Allergies/Adverse Reactions: 


 





iopamidol [From Isovue-M] Allergy (Severe, Verified 10/24/17 23:01)


 Anaphylaxis


lisinopril [Lisinopril] Allergy (Severe, Verified 10/24/17 23:01)


 Swelling/neck,face,throat


azithromycin [From Zithromax Z-Tomas] Allergy (Intermediate, Verified 10/24/17 23:

01)


 Rash


codeine [Codeine] Allergy (Intermediate, Verified 10/24/17 23:01)


 Vomiting


erythromycin base [Erythromycin Base] Allergy (Intermediate, Verified 10/24/17 

23:01)


 Other-Enter Comments


meperidine HCl [From Demerol] Allergy (Intermediate, Verified 10/24/17 23:01)


 Vomiting


Penicillins Allergy (Intermediate, Verified 10/24/17 23:01)


 Hives


Iodine and Iodide Containing Produc Allergy (Verified 10/24/17 23:02)


 








Home Medications: 














 Medication  Instructions  Recorded


 


Ascorbic Acid [Vitamin C 500 mg 1,000 mg PO DAILY 10/21/14





(*)]  


 


Aspirin [Aspirin 81mg (*)] 81 mg PO DAILY 10/21/14


 


Fexofenadine HCl [Allegra Allergy] 60 mg PO DAILY PRN 10/21/14


 


Herbals/Supplements -Info Only 1 ea PO DAILY 10/21/14


 


Levothyroxine [Synthroid 112 mcg 112 mcg PO DAILY06 10/21/14





(*)]  


 


Magnesium Oxide [Magnesium Oxide 200 mg PO DAILY 10/21/14





400 mg (*)]  


 


Multivitamins [Multivitamin (*)] 1 each PO DAILY 10/21/14


 


Omega-3 Fatty Acids [Fish Oil 1000 1,000 mg PO DAILY 10/21/14





mg (*)]  


 


Calcium Carbonate [Oyster Shell 500 mg PO DAILY 08/20/15





Calcium 500 mg (*)]  


 


Cholecalciferol (Vitamin D3) 400 unit PO DAILY 08/20/15





[Delta D3]  


 


Ibuprofen [Motrin (*)] 600 mg PO TID PRN 08/20/15


 


Letrozole [Femara 2.5 mg (*)] 2.5 mg PO DAILY 08/20/15


 


Losartan Potassium [Cozaar] 100 mg PO DAILY 08/20/15


 


Ondansetron Odt [Zofran Odt] 4 mg PO Q4PRN PRN #20 tab 10/17/17














Medical Decision Making





- Diagnostics


EKG Interpretation: 





ECG time 11:40 p.m..  Sinus rhythm with a rate of 97, normal axis, low-voltage 

throat, normal intervals, she has T-wave inversions in V2 and V3.  These are 

new compared to an ECG from 08/20/2015.


ED Course/Re-evaluation: 





Patient noted to have a new ECG changes with T-wave inversions in V2 and V3.  

She also has an elevated D-dimer.  I have discussed CT scanning of her chest.  

She states she has allergy to IV contrast dye for both CT and MRI which 

produces hives.  She is refusing CT scan at this time.  I have ordered a V/Q 

scan but has been told that this can only be obtained during daylight hours.  

Will plan to give her anticoagulation and admit overnight.  Hospitalist has 

been paged.





Troponin back and is now elevated.  Discussed with Dr. Taveras, 

hospitalist.  Will give a shot of Lovenox now.  Will order ultrasounds of her 

legs now.  Admit with plan for V/Q scan in the morning.





- Data Points


Laboratory Results: 


 Laboratory Results





 10/24/17 23:33 





 10/24/17 23:33 





 











  10/24/17 10/24/17 10/24/17





  23:33 23:33 23:33


 


WBC      6.51 10^3/uL 10^3/uL





     (3.80-9.50) 


 


RBC      4.15 10^6/uL L 10^6/uL





     (4.18-5.33) 


 


Hgb      12.9 g/dL g/dL





     (12.6-16.3) 


 


Hct      36.2 % L %





     (38.0-47.0) 


 


MCV      87.2 fL fL





     (81.5-99.8) 


 


MCH      31.1 pg pg





     (27.9-34.1) 


 


MCHC      35.6 g/dL g/dL





     (32.4-36.7) 


 


RDW      13.2 % %





     (11.5-15.2) 


 


Plt Count      289 10^3/uL 10^3/uL





     (150-400) 


 


MPV      9.8 fL fL





     (8.7-11.7) 


 


Neut % (Auto)      60.5 % %





     (39.3-74.2) 


 


Lymph % (Auto)      18.6 % %





     (15.0-45.0) 


 


Mono % (Auto)      13.5 % H %





     (4.5-13.0) 


 


Eos % (Auto)      6.0 % %





     (0.6-7.6) 


 


Baso % (Auto)      0.5 % %





     (0.3-1.7) 


 


Nucleat RBC Rel Count      1.7 % H %





     (0.0-0.2) 


 


Absolute Neuts (auto)      3.94 10^3/uL 10^3/uL





     (1.70-6.50) 


 


Absolute Lymphs (auto)      1.21 10^3/uL 10^3/uL





     (1.00-3.00) 


 


Absolute Monos (auto)      0.88 10^3/uL H 10^3/uL





     (0.30-0.80) 


 


Absolute Eos (auto)      0.39 10^3/uL 10^3/uL





     (0.03-0.40) 


 


Absolute Basos (auto)      0.03 10^3/uL 10^3/uL





     (0.02-0.10) 


 


Absolute Nucleated RBC      0.11 10^3/uL H 10^3/uL





     (0-0.01) 


 


Immature Gran %      0.9 % %





     (0.0-1.1) 


 


Immature Gran #      0.06 10^3/uL 10^3/uL





     (0.00-0.10) 


 


Platelet Estimate      ADEQUATE 





     (ADEQ) 


 


Polychromasia      1+  H 





    


 


Smear Review By      Pending 





    


 


D-Dimer    3.84 ug/mLFEU H ug/mLFEU  





    (0.00-0.50)  


 


Sodium  128 mEq/L L mEq/L    





   (134-144)   


 


Potassium  3.4 mEq/L L mEq/L    





   (3.5-5.2)   


 


Chloride  91 mEq/L L mEq/L    





   ()   


 


Carbon Dioxide  27 mEq/l mEq/l    





   (22-31)   


 


Anion Gap  10 mEq/L mEq/L    





   (8-16)   


 


BUN  6 mg/dL L mg/dL    





   (7-23)   


 


Creatinine  0.9 mg/dL mg/dL    





   (0.6-1.0)   


 


Estimated GFR  > 60     





    


 


Glucose  132 mg/dL H mg/dL    





   ()   


 


Calcium  8.4 mg/dL L mg/dL    





   (8.5-10.4)   


 


Total Bilirubin  1.2 mg/dL mg/dL    





   (0.1-1.4)   


 


AST  53 IU/L H IU/L    





   (14-46)   


 


ALT  64 IU/L H IU/L    





   (9-52)   


 


Alkaline Phosphatase  98 IU/L IU/L    





   ()   


 


Troponin I  0.064 ng/mL H ng/mL    





   (0.000-0.034)   


 


Total Protein  5.2 g/dL L g/dL    





   (6.3-8.2)   


 


Albumin  3.0 g/dL L g/dL    





   (3.5-5.0)   


 


Lipase  81 IU/L IU/L    





   ()   














Departure





- Departure


Disposition: [facility] Inpatient Acute


Clinical Impression: 


 Shortness of breath





Condition: Fair


Referrals: 


Jaziel Oliveira MD [Primary Care Provider] - As per Instructions

## 2017-10-25 NOTE — ECHO
[email or web address]





[address] [address] 

Main: [phone] 



Fax: 



Transthoracic Echocardiogram 

Name:             JANUARY JOSEPH                        MR#:

[id number]

Study Date:       10/25/2017                            Study Time:

08:42 AM

YOB: 1940                            Age:

77 year(s)

Height:           160 cm (63 in.)                       Weight:

68.04 kg (150 lb.)

BSA:              1.71 m2                               Gender:

Female

Examination:      Echo                                  Indication:

Subacute fatigue/SOB, History stage IV breast



CA 

Image Quality:                                          Contrast: 

Requested by:     Corby Niño                  BP:

/

Heart Rate:                                             Rhythm: 

Indication:       Subacute fatigue/SOB, History stage IV breast CA 



Procedure Staff 

Ultrasound Technician:   Flores Huertas Physician:       Abdi Mathew 

Requesting Provider: 



Conclusions:           The ejection fraction is estimated to be 70-75

%.

Diastolic dysfunction is present. .  

Trivial mitral valve regurgitation.  

Posterior mitral leaflet is calcific..  

Mildly calcific NCC of the aortic valve..  

Small to moderate pericardial effusion. 



Measurements: 

Chambers                    Valvular Assessment AV/MV

Valvular Assessment TV/PV



Normal                                   Normal

Normal

Name         Value     Range              Name         Value Range

Name           Value Range

Ao Jessica (MM): 3.0 cm    (2.2 cm-3.7            AV Vmax:     1.39 m/s (1

m/s-1.7        TR Vmax:       3.01 mm/s ( - )



cm)                                  m/s)              TR PGmax:

36 mmHg ( - )

IVSd (2D):   0.6 cm (0.6 cm-1.1               AV maxP mmHg ( -

)           syst. PAP: 41 mmHg ( - )



cm)                MV E Vmax:   0.95 m/s ( - )  

LVDd (2D):   4.0 cm    (3.9 cm-5.3            MV A Vmax:   1.51 m/s (

- )



cm)                MV E/A:      0.63 ( - )  

LVDs (2D):   2.5 cm    (2.1 cm-4 



cm)   

LVPWd (2D):  0.7 cm    ( - )   

LVEF (2D):   69        (>=54 %)   

EF Range:    70-75 % 



Continued Measurements: 

Chambers                    Valvular Assessment AV/MV

Valvular Assessment TV/PV



Name                     Value            Name

Value      Name                    Value

LADs:                  2.8 cm                 MV E' Septal:

0.04  m/s   CVP (est.):            5 mmHg

LADs Lon.7 cm                 MV E/E' Septal:

24.40



Patient: JANUARY JOSEPH                      MRN: [id number]

Study Date: 10/25/2017   Page 1 of 2

08:42 AM 









LA Area: 14.0 cm2   MV E/E' Lateral: 20.80  



Findings:              Left Ventricle: 

Normal size left ventricle. Global hypercontractility of the left

ventricle. The ejection fraction is

estimated to be 70-75 %. No regional wall motion abnormality.

Diastolic dysfunction is present. .

Right Ventricle: 

Normal size right ventricle.  

Left Atrium: 

The left atrium is normal in size.  

Right Atrium: 

The right atrium is normal in size.  

Mitral Valve: 

Trivial mitral valve regurgitation. Posterior mitral leaflet is

calcific..

Aortic Valve: 

The aortic valve is tri-leaflet. Mildly calcific NCC of the aortic

valve..

Tricuspid Valve: 

The tricuspid valve appears normal. Mild to moderate tricuspid valve

regurgitation.

Pericardium: 

Small to moderate pericardial effusion. No echocardiographic evidence

of hemodynamic

compromise. Slight RA collapse.. 







Electronically signed by Abdi Mathew on 10/25/2017 at 03:17 PM 

(No Signature Object) 



Patient: JANUARY JOSEPH                      MRN: [id number]

Study Date: 10/25/2017   Page 2 of 2

08:42 AM 







[id number]

## 2017-10-25 NOTE — HOSPPROG
Hospitalist Progress Note


Assessment/Plan: 








78 yo F w/ hypothyroid, HTN, and metastatic breast CA presents with subacute 

fatigue and SOB. She was admitted earlier by Dr Coreas.  Came by to follow 

up iwth her.








* fatigue with shortness of breath


-recently started on new cancer medications


-the side effect of both these medications is fatigue


-D-dimer is elevated but patient has allergies to contrast dye


-V/Q scan ordered


-TSH is stable


-will also investigate any etiology of heart failure.  Will check an echo


-patient has some small pleural effusions which may be contributing to her 

shortness of breath


-ultrasound to lower extremities is negative for a DVT





* AHRF - Mild, requiring 2 L/min via NC to maintain O2 sats >90%. Could simply 

be 2/2 atelectasis in setting of recent poor mobility, but will investigate 

other etiologies as above with TTE and VQ scan.





* Hyponatremia - Has prior hx of this, with most recent value of 128 one week 

prior to admission. I suspect at least some hypovolemic component noting poor 

PO intake.


- 500 mL NS and recheck BMP in AM


- Will check Lizzie and OSMs





* elevation in troponin


-could be demand ischemia will follow the echocardiogram





* transaminitis


-possibly related to new medications





* hypothyroidism


-TSH stable





* hypertension


-bp stable





*plan: f/u with echo and V/Q scan





Subjective: Ingrid is anxious about having some diarrhea/ cont to c/o 

shortness of breath.


Objective: 


 Vital Signs











Temp Pulse Resp BP Pulse Ox


 


 37 C   94   20   112/66   91 L


 


 10/25/17 08:00  10/25/17 08:00  10/25/17 08:00  10/25/17 08:00  10/25/17 08:00








 Laboratory Results





 10/25/17 05:10 





 10/25/17 05:10 





 











 10/24/17 10/25/17 10/26/17





 05:59 05:59 05:59


 


Intake Total  200 


 


Output Total  425 


 


Balance  -225 














- Physical Exam


Constitutional: appears nourished, not in pain


Eyes: PERRL


Ears, Nose, Mouth, Throat: hearing normal


Cardiovascular: regular rate and rhythym


Respiratory: no respiratory distress, reduced air movement (bases, mainly right 

base)


Skin: warm


Musculoskeletal: no muscle tenderness


Neurologic: AAOx3


Psychiatric: interacting appropriately





ICD10 Worksheet


Patient Problems: 


 Problems











Problem Status Onset


 


Shortness of breath Acute  


 


BREAST CANCER, BILATERAL Acute  


 


Cough Acute  


 


Hypertension Acute  


 


Hypothyroid Acute

## 2017-10-26 NOTE — ASDISCHSUM
----------------------------------------------

Discharge Information

----------------------------------------------

Plan Status:                                         Medically Cleared to Leave:

Discharge Date:10/25/2017 05:10 PM                   CM D/C Disposition:

ADT D/C Disposition:Home, Routine, Self-Care         Projected Discharge Date:10/25/2017 05:10 PM

Transportation at D/C:                               Discharge Delay Reason:

Follow-Up Date:10/25/2017 05:10 PM                   Discharge Slot:

Final Diagnosis:

----------------------------------------------

Placement Information

----------------------------------------------

----------------------------------------------

Patient Contact Information

----------------------------------------------

Contact Name:EJ                            Relationship:Life Partner

Address:[address] [place] [address] [phone]

                                                     Work [phone]

City:[place]                                       Alternate Phone:

[facility]/Zip Code:[place] [address]                              Email:

----------------------------------------------

Financial Information

----------------------------------------------

Financial Class:

Primary Plan Desc:MEDICARE OUTPATIENT                Primary Plan Number:[id number]

Secondary Plan Desc:[employer]/MDR SUPPLEMENT              Secondary Plan Number:[id number]

 

 

----------------------------------------------

Assessment Information

----------------------------------------------

----------------------------------------------

Intervention Information

----------------------------------------------

Intervention Type:*LACIE-Signed                       Date of Service:10/25/2017 09:41 AM

Patient Type:Observation                             Staff Member:Na Louie

Hours:                                               Discipline:

Severity:                                            Comment:

## 2017-11-25 ENCOUNTER — HOSPITAL ENCOUNTER (EMERGENCY)
Dept: HOSPITAL 80 - FED | Age: 77
Discharge: HOME | End: 2017-11-25
Payer: COMMERCIAL

## 2017-11-25 VITALS
OXYGEN SATURATION: 94 % | SYSTOLIC BLOOD PRESSURE: 122 MMHG | DIASTOLIC BLOOD PRESSURE: 69 MMHG | TEMPERATURE: 99.3 F | HEART RATE: 78 BPM | RESPIRATION RATE: 16 BRPM

## 2017-11-25 DIAGNOSIS — I10: ICD-10-CM

## 2017-11-25 DIAGNOSIS — M79.651: Primary | ICD-10-CM

## 2017-11-25 DIAGNOSIS — Z85.3: ICD-10-CM

## 2017-11-25 DIAGNOSIS — Z79.82: ICD-10-CM

## 2017-11-25 LAB
% IMMATURE GRANULYOCYTES: 0.2 % (ref 0–1.1)
ABSOLUTE IMMATURE GRANULOCYTES: 0.01 10^3/UL (ref 0–0.1)
ABSOLUTE NRBC COUNT: 0 10^3/UL (ref 0–0.01)
ADD DIFF?: NO
ADD MORPH?: NO
ADD SCAN?: NO
ANION GAP SERPL CALC-SCNC: 5 MEQ/L (ref 8–16)
ATYPICAL LYMPHOCYTE FLAG: 0 (ref 0–99)
CALCIUM SERPL-MCNC: 8.6 MG/DL (ref 8.5–10.4)
CHLORIDE SERPL-SCNC: 97 MEQ/L (ref 97–110)
CO2 SERPL-SCNC: 28 MEQ/L (ref 22–31)
CREAT SERPL-MCNC: 1 MG/DL (ref 0.6–1)
ERYTHROCYTE [DISTWIDTH] IN BLOOD BY AUTOMATED COUNT: 16.9 % (ref 11.5–15.2)
FRAGMENT RBC FLAG: 0 (ref 0–99)
GFR SERPL CREATININE-BSD FRML MDRD: 54 ML/MIN/{1.73_M2}
GLUCOSE SERPL-MCNC: 98 MG/DL (ref 70–100)
HCT VFR BLD CALC: 35.3 % (ref 38–47)
HGB BLD-MCNC: 12.4 G/DL (ref 12.6–16.3)
LEFT SHIFT FLG: 0 (ref 0–99)
LIPEMIA HEMOLYSIS FLAG: 90 (ref 0–99)
MCH RBC BLDCO QN: 31.4 PG (ref 27.9–34.1)
MCHC RBC AUTO-ENTMCNC: 35.1 G/DL (ref 32.4–36.7)
MCV RBC AUTO: 89.4 FL (ref 81.5–99.8)
NRBC-AUTO%: 0 % (ref 0–0.2)
PLATELET # BLD: 303 10^3/UL (ref 150–400)
PLATELET CLUMPS FLAG: 0 (ref 0–99)
PMV BLD AUTO: 8.9 FL (ref 8.7–11.7)
POTASSIUM SERPL-SCNC: 4 MEQ/L (ref 3.5–5.2)
RBC # BLD AUTO: 3.95 10^6/UL (ref 4.18–5.33)
SODIUM SERPL-SCNC: 130 MEQ/L (ref 134–144)

## 2017-11-25 NOTE — EDPHY
HPI/HX/ROS/PE/MDM


Narrative: 





CHIEF COMPLAINT: Right thigh pain





HPI: The patient is a 78 y/o female with history of metastatic breast cancer 

and hypertension arriving with her  complaining of aching right thigh 

pain that woke her from sleep early this morning. Her pain is located along her 

anterior thigh and not significantly aggravated by moving, though walking 

through the parking lot into the ED was painful. She was admitted here one 

month ago with similar symptoms and bilateral lower extremity ultrasounds were 

negative for DVT and an NM lung perfusion study did not indicate a PE at that 

time. She has known metastases to bone and liver and started a new chemotherapy

, Xeloda, two weeks ago. She denies fever or chills at home, shortness of breath

, or other acute symptoms. She has not taken anything for her pain. 





REVIEW OF SYSTEMS:


Aside from elements discussed in the HPI, a comprehensive 10-point review of 

systems was reviewed and is negative.





PMH: Breast cancer metastasized to bone and liver - Xeloda; on home O2 while at 

altitude; hypertension; transaminitis, hypothyroidism





Prior medical records reviewed included admission 10/25/17 for shortness of 

breath.





SOCIAL HISTORY: Lives near [place].  at bedside. Oncologist: Willis





PHYSICAL EXAM:


General:Patient is alert, in no acute distress.


ENT:Eyes are normal to inspection.  ENT inspection normal.


Neck: Normal inspection.  Full range of motion.


Respiratory:No respiratory distress.  Breath sounds normal bilaterally.


Cardiovascular: Regular rate and rhythm.  Strong peripheral pulses.  Normal cap 

refill.


Abdomen:The abdomen is nontender to palpation. There are no peritoneal signs. 


Back: Normal to inspection.  No tenderness to palpation.


Skin: Normal color.  No rash.  Warm and dry.


Extremities: Normal appearance.  Full range of motion.


Neuro: Oriented x3.  Normal motor function.  Normal sensory function.


ED Course: 


This is a 78 y/o female with active metastatic breast cancer who presents with 

acute right anterior thigh pain that woke her from sleep early this morning. 

She has no associated respiratory or neuro symptoms. She is non-tender over the 

site and has no trauma or skin changes on exam. Plan for extremity US to rule 

out DVT and femur x-ray to rule out pathologic fracture. She declines pain 

medication at this time.





Her US shows no evidence of DVT. Her femur x-ray shows a sclerotic bone lesion 

in her femur. I discussed these results with the patient. She has pain 

medication available at home. She will follow up with her PCP and oncologist as 

needed. Return precautions discussed. 





- Data Points


Imaging Results: 


 Imaging Impressions





Femur X-Ray  11/25/17 07:28


Impression: Sclerotic bone metastasis. No pathologic fracture.








Extremity Venous Study  11/25/17 07:29


Impression: Negative. No deep venous thrombosis.


 


Findings discussed with Emergency Department physician, Terrell Trujillo M.D. 

at 8:35 a.m. on November 25, 2017.


 


 











Imaging: Discussed imaging studies w/ On call Radiologist


Laboratory Results: 


 Laboratory Results





 11/25/17 07:51 





 11/25/17 07:51 





 











  11/25/17 11/25/17





  07:51 07:51


 


WBC    4.70 10^3/uL 10^3/uL





    (3.80-9.50) 


 


RBC    3.95 10^6/uL L 10^6/uL





    (4.18-5.33) 


 


Hgb    12.4 g/dL L g/dL





    (12.6-16.3) 


 


Hct    35.3 % L %





    (38.0-47.0) 


 


MCV    89.4 fL fL





    (81.5-99.8) 


 


MCH    31.4 pg pg





    (27.9-34.1) 


 


MCHC    35.1 g/dL g/dL





    (32.4-36.7) 


 


RDW    16.9 % H %





    (11.5-15.2) 


 


Plt Count    303 10^3/uL 10^3/uL





    (150-400) 


 


MPV    8.9 fL fL





    (8.7-11.7) 


 


Neut % (Auto)    78.1 % H %





    (39.3-74.2) 


 


Lymph % (Auto)    16.6 % %





    (15.0-45.0) 


 


Mono % (Auto)    3.6 % L %





    (4.5-13.0) 


 


Eos % (Auto)    1.3 % %





    (0.6-7.6) 


 


Baso % (Auto)    0.2 % L %





    (0.3-1.7) 


 


Nucleat RBC Rel Count    0.0 % %





    (0.0-0.2) 


 


Absolute Neuts (auto)    3.67 10^3/uL 10^3/uL





    (1.70-6.50) 


 


Absolute Lymphs (auto)    0.78 10^3/uL L 10^3/uL





    (1.00-3.00) 


 


Absolute Monos (auto)    0.17 10^3/uL L 10^3/uL





    (0.30-0.80) 


 


Absolute Eos (auto)    0.06 10^3/uL 10^3/uL





    (0.03-0.40) 


 


Absolute Basos (auto)    0.01 10^3/uL L 10^3/uL





    (0.02-0.10) 


 


Absolute Nucleated RBC    0.00 10^3/uL 10^3/uL





    (0-0.01) 


 


Immature Gran %    0.2 % %





    (0.0-1.1) 


 


Immature Gran #    0.01 10^3/uL 10^3/uL





    (0.00-0.10) 


 


Sodium  130 mEq/L L mEq/L  





   (134-144)  


 


Potassium  4.0 mEq/L mEq/L  





   (3.5-5.2)  


 


Chloride  97 mEq/L mEq/L  





   ()  


 


Carbon Dioxide  28 mEq/l mEq/l  





   (22-31)  


 


Anion Gap  5 mEq/L L mEq/L  





   (8-16)  


 


BUN  18 mg/dL mg/dL  





   (7-23)  


 


Creatinine  1.0 mg/dL mg/dL  





   (0.6-1.0)  


 


Estimated GFR  54   





   


 


Glucose  98 mg/dL mg/dL  





   ()  


 


Calcium  8.6 mg/dL mg/dL  





   (8.5-10.4)  














General


Initial Vital Signs: 


 Initial Vital Signs











Temperature (C)  37.9 C   11/25/17 07:07


 


Heart Rate  115 H  11/25/17 07:07


 


O2 Sat (%)  91 L  11/25/17 07:07








 











O2 Delivery Mode               Room Air














Allergies/Adverse Reactions: 


 





iopamidol [From Isovue-M] Allergy (Severe, Verified 11/25/17 07:05)


 Anaphylaxis


lisinopril [Lisinopril] Allergy (Severe, Verified 11/25/17 07:05)


 Swelling/neck,face,throat


azithromycin [From Zithromax Z-Tomas] Allergy (Intermediate, Verified 11/25/17 07:

05)


 Rash


codeine [Codeine] Allergy (Intermediate, Verified 11/25/17 07:05)


 Vomiting


erythromycin base [Erythromycin Base] Allergy (Intermediate, Verified 11/25/17 

07:05)


 Other-Enter Comments


meperidine HCl [From Demerol] Allergy (Intermediate, Verified 11/25/17 07:05)


 Vomiting


Penicillins Allergy (Intermediate, Verified 11/25/17 07:05)


 Hives


everolimus [From Afinitor] Allergy (Verified 11/25/17 07:05)


 Hives


Iodine and Iodide Containing Produc Allergy (Verified 11/25/17 07:05)


 








Home Medications: 














 Medication  Instructions  Recorded


 


Ascorbic Acid [Vitamin C 500 mg 1,000 mg PO DAILY 10/21/14





(*)]  


 


Aspirin [Aspirin 81mg (*)] 81 mg PO DAILY 10/21/14


 


Fexofenadine HCl [Allegra Allergy] 60 mg PO DAILY PRN 10/21/14


 


Levothyroxine [Synthroid 112 mcg 112 mcg PO DAILY06 10/21/14





(*)]  


 


Multivitamins [Multivitamin (*)] 1 each PO DAILY 10/21/14


 


Omega-3 Fatty Acids [Fish Oil 1000 1,000 mg PO DAILY 10/21/14





mg (*)]  


 


Calcium Carbonate [Oyster Shell 500 mg PO DAILY 08/20/15





Calcium 500 mg (*)]  


 


Albuterol [Proventil Inhaler HFA 1 - 2 puffs IH DAILY PRN 10/25/17





(*)]  


 


Losartan/Hydrochlorothiazide 1 each PO DAILY 10/25/17





[Hyzaar 100-12.5 Tablet]  


 


Xeloda  11/25/17


 


Zofran  11/25/17














Departure





- Departure


Disposition: Home, Routine, Self-Care


Clinical Impression: 


Thigh pain


Qualifiers:


 Laterality: right Qualified Code(s): M79.651 - Pain in right thigh





Condition: Good


Instructions:  Leg Pain (ED)


Additional Instructions: 


Follow up with your primary care provider and oncologist as needed. Return to 

the ED for severe worsening pain, weakness or numbness in your leg, shortness 

of breath, or other worsening of condition.


Referrals: 


Jaziel Oliveira MD [Primary Care Provider] - As per Instructions


Report Scribed for: Terrell Trujillo


Report Scribed by: Martha Pang


Date of Report: 11/25/17


Time of Report: 07:29


Physician Review and Approval Statement: Portions of this note were transcribed 

by an ED scribe.  I personally performed the history, physical exam, and 

medical decision making; and confirm the accuracy of the information in the 

transcribed note.

## 2018-09-18 ENCOUNTER — HOSPITAL ENCOUNTER (OUTPATIENT)
Dept: HOSPITAL 80 - FED | Age: 78
Setting detail: OBSERVATION
LOS: 1 days | Discharge: HOME | End: 2018-09-19
Attending: INTERNAL MEDICINE | Admitting: INTERNAL MEDICINE
Payer: COMMERCIAL

## 2018-09-18 DIAGNOSIS — C78.7: ICD-10-CM

## 2018-09-18 DIAGNOSIS — Z90.13: ICD-10-CM

## 2018-09-18 DIAGNOSIS — R10.13: Primary | ICD-10-CM

## 2018-09-18 DIAGNOSIS — I10: ICD-10-CM

## 2018-09-18 DIAGNOSIS — E03.9: ICD-10-CM

## 2018-09-18 DIAGNOSIS — C79.51: ICD-10-CM

## 2018-09-18 DIAGNOSIS — R07.9: ICD-10-CM

## 2018-09-18 DIAGNOSIS — C50.919: ICD-10-CM

## 2018-09-18 DIAGNOSIS — E86.9: ICD-10-CM

## 2018-09-18 PROCEDURE — 96375 TX/PRO/DX INJ NEW DRUG ADDON: CPT

## 2018-09-18 PROCEDURE — 99285 EMERGENCY DEPT VISIT HI MDM: CPT

## 2018-09-18 PROCEDURE — 71045 X-RAY EXAM CHEST 1 VIEW: CPT

## 2018-09-18 PROCEDURE — G0378 HOSPITAL OBSERVATION PER HR: HCPCS

## 2018-09-18 PROCEDURE — 74176 CT ABD & PELVIS W/O CONTRAST: CPT

## 2018-09-18 PROCEDURE — 96374 THER/PROPH/DIAG INJ IV PUSH: CPT

## 2018-09-18 PROCEDURE — 96361 HYDRATE IV INFUSION ADD-ON: CPT

## 2018-09-18 PROCEDURE — 76705 ECHO EXAM OF ABDOMEN: CPT

## 2018-09-18 PROCEDURE — 93005 ELECTROCARDIOGRAM TRACING: CPT

## 2018-09-18 PROCEDURE — 96376 TX/PRO/DX INJ SAME DRUG ADON: CPT

## 2018-09-19 VITALS — SYSTOLIC BLOOD PRESSURE: 118 MMHG | DIASTOLIC BLOOD PRESSURE: 68 MMHG

## 2018-09-19 LAB
INR PPP: 1.24 (ref 0.83–1.16)
PLATELET # BLD: 187 10^3/UL (ref 150–400)
PROTHROMBIN TIME: 15.8 SEC (ref 12–15)

## 2018-09-19 NOTE — GDS
HISTORY OF PRESENT ILLNESS:  This is a pleasant 78-year-old female with metastatic breast cancer with
 disease to bones and liver, hypertension, hypothyroidism, presenting with abdominal pain.  She devel
oped severe acute onset of epigastric pain after eating homemade stir aleman dinner.  It was sharp and p
ressure-like without radiation associated shortness of breath, nausea, or vomiting.  She took Pepto-B
ismol and tried to lie down, but the pain continued, thus came to the ER. 



She also complained of a bandlike pressure through her upper chest for intermittently for the last co
uple days, did not change with activity.  No radiation or other associated symptoms.  In the ER, righ
t upper quadrant ultrasound was negative for gallstones or cholecystitis and CT showed mild to modera
te ascites and no liver metastatic disease.  She states that her cancer has progressed despite chemot
herapy.  She is followed at [facility].



HOSPITAL COURSE BY PROBLEM:  

1.  Acute abdominal pain, unclear etiology may have been GERD versus ascites or liver METS.  Was evid
ence of constipation on CT.  Pain was resolved after 2 doses of Dilaudid.  She is without pain this m
orning.  She has been taking NSAIDs at home, so this may contribute to some gastritis.  Recommend con
tinuing PPI.  Discussed options for the ascites including Lasix or paracentesis.  She would like to h
old off on both of these.  She is to follow up with her PCP and oncologist this week.

2.  Chest pain:  Associated with palpitations.  She had a normal echocardiogram 2017 and normal tammy
terization in 14.  Pain is no longer present.  There was no associated nausea, vomiting, shortness of
 breath.  Troponins and EKGs were negative x2.

3.  PVCs.  Replete potassium.  Check magnesium.

4.  Metastatic breast cancer:  She is on her 7th line of chemotherapy and has had quite a few issues.
  I had a lengthy conversation with both she and her  recommending outpatient palliative care,
 which she was interested in.  She will follow up with her PCP tomorrow to have a referral.

5.  Hypertension.  Resume home medications.

6.  Hypothyroidism, levothyroxine.



DISPOSITION:  Patient is stable for discharge home.



MEDICATIONS:  P.r.n. Dilaudid if needed.



RETURN PRECAUTIONS:  Chest pain, shortness of breath, fevers, increased abdominal pain.



FOLLOWUP:  

1.  Her primary oncologist at [facility].  

2.  Primary care physician.  

3.  Referral for palliative care.



PHYSICAL EXAMINATION:  VITAL SIGNS:  Temperature 36.4, blood pressure 118/68, heart rate in the 80s, 
respirations 21, 96% on room air.  GENERAL:  She is well appearing, no acute distress, smiling.  HEEN
T:  PERRLA.  Moist mucous membranes.  CV:  Regular rate and rhythm.  LUNGS:  Clear.  ABDOMEN:  Mildly
 distended but soft.  No tenderness.  CHEST:  No lower extremity edema or reproducible chest pain.  G
U:  No Hidalgo.  Musculoskeletal 5/5 upper lower and extremity strength.  NEURO:  2 through 12 intact. 
 PSYCH:  Alert and oriented x3.



TIME SPENT ON DISCHARGE:  Greater than 30 minutes counseling, patient has been on palliative care.  GENO mirza.





Job #:  [id number]

## 2018-09-19 NOTE — EDPHY
H & P


Stated Complaint: epigastric pain


Time Seen by Provider: 09/19/18 00:06


HPI/ROS: 





HPI





CHIEF COMPLAINT:  Abdominal pain, chest pain





HISTORY OF PRESENT ILLNESS:  This is a 78-year-old female, history of 

metastatic breast cancer stage IV to the liver, followed at [facility], bilateral mastectomy, and hysterectomy, presents emergency room with 

epigastric abdominal pain and chest pain.  States around 3:00 p.m. This 

afternoon she developed some chest tightness like a band around her chest with 

nausea.  Additionally developed epigastric and upper abdominal pain.  Nausea 

but no vomiting.  Denies diarrhea.  Denies lower abdominal pain, denies fever 

chills, denies productive cough or shortness of breath.


Main complaint brought to the emergency room is the chest tightness and 

worsening increasing epigastric pain.  This was worse after eating dinner 

around 8:00 p.m..  It is now 1230 at night.  She describes her pain is 8/10 

currently located epigastric region.





Past Medical History:  Metastatic stage IV breast cancer to liver





Past Surgical History:  Bilateral mastectomy, hysterectomy





Social History:  Denies daily use drugs alcohol tobacco





Family History:  Noncontributory








ROS   


REVIEW OF SYSTEMS:


10 Systems were reviewed and negative with the exception of the elements 

mentioned in the history of present illness.








Exam   


Constitutional  nontoxic appearing, triage nursing summary reviewed, vital 

signs reviewed, awake/alert. 


Eyes   normal conjunctivae and sclera, EOMI, PERRLA. 


HENT   normal inspection, atraumatic, moist mucus membranes, no epistaxis, neck 

supple/ no meningismus, no raccoon eyes. 


Respiratory   clear to auscultation bilaterally, normal breath sounds, no 

respiratory distress, no wheezing. 


Cardiovascular   rate normal, regular rhythm, no murmur, no edema, distal 

pulses normal. 


Gastrointestinal  mild tender palpation epigastric region, no rebound, no 

guarding, normal bowel sounds, no distension, no pulsatile mass. 


Genitourinary   no CVA tenderness. 


Musculoskeletal  no midline vertebral tenderness, full range of motion, no calf 

swelling, no tenderness of extremities, no meningismus, good pulses, 

neurovascularly intact.


Skin   pink, warm, & dry, no rash, skin atraumatic. 


Neurologic   awake, alert and oriented x 3, AAOx3, moves all 4 extremities 

equally, motor intact, sensory intact, CN II-XII intact, normal cerebellar, 

normal vision, normal speech. 


Psychiatric   normal mood/affect. 


Heme/Lymph/Immune   no lymphadenopathy.





Differential diagnosis includes but is not limited to:  ACS, atypical chest pain

, pneumothorax, pneumonia, pulmonary embolism, aortic dissection, congestive 

heart failure, tumor, musculoskeletal pain, esophageal pain, GERD, peptic ulcer 

disease, pancreatitis





Medical Decision Making:  Plan for this patient with epigastric pain and chest 

pain, EKG, full cardiac monitor, IV establishment blood draw, troponin, rule 

out acute coronary syndrome, chest x-ray, ultrasound right upper quadrant.  IV 

fluid bolus, IV Dilaudid 1 mg for pain control re-evaluate.





Re-evaluation:








EKG interpretation by me on record in [facility] system.  Impression time of 

EKG 0026:  Normal sinus rhythm rate of 89, no signs of acute ischemia no signs 

of cardiac arrhythmia no ST elevation or ST depression no T-wave abnormalities.





Ultrasound shows multiple metastatic lesions in the liver.  Additionally fluid 

is present ascites.





0218:  I did go re-evaluate the patient she still having ongoing worsening 

abdominal pain.  I reordered her IV Dilaudid.  Additionally will CT scan her 

abdomen.  However she has contrast allergy.  This can be done without contrast.





After long discussion with the patient she would prefer to be admitted 

overnight for pain control.








0256:  CT scan abdomen pelvis without contrast shows large amount of metastatic 

disease in the liver and bony Mets.  Also moderate amount of ascites and 

constipation.








0336:  Patient still has abdominal pain.  Plan will be for admission to the 

hospitalist service for pain control and observation to the extensive liver 

mets and ascites.





Spoke with the hospitalist service Dr. Coreas Agrees to admit. 





Patient would additionally like to stay for pain control. 


Source: Patient





- Personal History


Current Tetanus/Diphtheria Vaccine: Yes


Current Tetanus Diphtheria and Acellular Pertussis (TDAP): Yes


Tetanus Vaccine Date: < 10 years





- Medical/Surgical History


Hx Asthma: No


Hx Chronic Respiratory Disease: No


Hx Diabetes: No


Hx Cardiac Disease: Yes


Hx Renal Disease: No


Hx Cirrhosis: No


Hx Alcoholism: No


Hx HIV/AIDS: No


Hx Splenectomy or Spleen Trauma: No


Other PMH: HTN, Hysterectomy, double mastectomy, Breast CA-mets to liver and 

bones, lymphedema,hypothyroid





- Social History


Smoking Status: Former smoker


Constitutional: 


 Initial Vital Signs











Temperature (C)  36.8 C   09/18/18 23:56


 


Heart Rate  93   09/18/18 23:56


 


Respiratory Rate  16   09/18/18 23:56


 


O2 Sat (%)  93   09/18/18 23:56








 











O2 Delivery Mode               Room Air


 


O2 (L/minute)                  2














Allergies/Adverse Reactions: 


 





iopamidol [From Isovue-M] Allergy (Severe, Verified 09/18/18 23:59)


 Anaphylaxis


lisinopril [Lisinopril] Allergy (Severe, Verified 09/18/18 23:59)


 Swelling/neck,face,throat


azithromycin [From Zithromax Z-Tomas] Allergy (Intermediate, Verified 09/18/18 23:

59)


 Rash


codeine [Codeine] Allergy (Intermediate, Verified 09/18/18 23:59)


 Vomiting


erythromycin base [Erythromycin Base] Allergy (Intermediate, Verified 09/18/18 

23:59)


 Other-Enter Comments


meperidine HCl [From Demerol] Allergy (Intermediate, Verified 09/18/18 23:59)


 Vomiting


Penicillins Allergy (Intermediate, Verified 09/18/18 23:59)


 Hives


everolimus [From Afinitor] Allergy (Verified 09/18/18 23:59)


 Hives


Iodine and Iodide Containing Produc Allergy (Verified 09/18/18 23:59)


 








Home Medications: 














 Medication  Instructions  Recorded


 


Ascorbic Acid [Vitamin C 500 mg 1,000 mg PO DAILY 10/21/14





(*)]  


 


Fexofenadine HCl [Allegra Allergy] 60 mg PO DAILY PRN 10/21/14


 


Multivitamins [Multivitamin (*)] 1 each PO DAILY 10/21/14


 


Omega-3 Fatty Acids [Fish Oil 1000 1,000 mg PO DAILY 10/21/14





mg (*)]  


 


Calcium Carbonate [Oyster Shell 500 mg PO DAILY 08/20/15





Calcium 500 mg (*)]  


 


Albuterol [Proventil Inhaler HFA 1 - 2 puffs IH DAILY PRN 10/25/17





(*)]  


 


Losartan/Hydrochlorothiazide 1 each PO DAILY 10/25/17





[Hyzaar 100-12.5 Tablet]  


 


Ondansetron Odt [Zofran Odt 4 mg 8 mg PO TID PRN 11/25/17





(*)]  


 


Glucosamine Sulfate [Glucosamine 500 mg PO DAILY 09/19/18





Sulfate 500 MG (*)]  


 


HYDROmorphone HCL [Dilaudid 2 mg 2 mg PO Q8H PRN #5 tab 09/19/18





(*)]  


 


Herbals/Supplements -Info Only 1 ea PO DAILY 09/19/18


 


Levothyroxine [Synthroid 100 mcg 100 mcg PO DAILY06 09/19/18





(*)]  


 


Omeprazole 40 mg PO DAILY 09/19/18














Medical Decision Making





- Data Points


Laboratory Results: 


 Laboratory Results





 09/19/18 00:36 





 09/19/18 00:36 








Medications Given: 


 








Discontinued Medications





Enoxaparin Sodium (Lovenox)  40 mg SC DAILY [facility]


   Stop: 03/18/19 08:59


   Last Admin: 09/19/18 10:03 Dose:  Not Given


Hydromorphone HCl (Dilaudid)  1 mg IVP EDNOW ONE


   Stop: 09/19/18 00:22


   Last Admin: 09/19/18 00:43 Dose:  1 mg


Hydromorphone HCl (Dilaudid)  0.5 mg IVP EDNOW ONE


   Stop: 09/19/18 02:18


   Last Admin: 09/19/18 03:02 Dose:  0.5 mg


Sodium Chloride (Ns)  1,000 mls @ 0 mls/hr IV EDNOW ONE; Wide Open


   PRN Reason: Protocol


   Stop: 09/19/18 00:07


   Last Admin: 09/19/18 00:43 Dose:  1,000 mls


Sodium Chloride (Ns)  1,000 mls @ 0 mls/hr IV ONCE ONE


   PRN Reason: Wide Open


   Stop: 09/19/18 02:18


   Last Admin: 09/19/18 03:02 Dose:  1,000 mls


Ondansetron HCl (Zofran)  4 mg IVP EDNOW ONE


   Stop: 09/19/18 00:23


   Last Admin: 09/19/18 00:43 Dose:  4 mg


Pantoprazole Sodium (Protonix)  40 mg PO DAILY GENARO


   Stop: 03/18/19 08:59


   Last Admin: 09/19/18 09:30 Dose:  40 mg


Potassium Chloride (Klor-Con)  40 meq PO ONCE ONE


   Stop: 09/19/18 13:48


   Last Admin: 09/19/18 13:58 Dose:  40 meq





Point of Care Test Results: 


 Chemistry











  09/19/18





  00:37


 


POC Troponin I  0.01 ng/mL ng/mL





   (0.00-0.08) 














Departure





- Departure


Disposition: [facility]


Clinical Impression: 


 Abdominal pain





Condition: Fair

## 2018-09-19 NOTE — PDGENHP
History and Physical





- Chief Complaint


Abdominal pain





- History of Present Illness


77 yo F w/ metastatic breast CA, HTN, and hypothyroid presents with abdominal 

pain. Patient experienced severe, acute onset abdominal pain this afternoon 

after eating  a stir aleman composed of vegetables and chicken. The pain was severe

, epi-gastric, and persistent. The took some Peptobismol and tried to lie down 

but the pain continued so she came to the ED. She is fairly comfortable now 

after pain medication.





In the ED RUQ U/S was negative for gallstones or acute cholecystitis and CT w/o 

contrast revealed mild-moderate ascites and known liver metastases. No 

obstruction or free air was seen.





Patient also tells me she experiences some band like chest pain and 

palpitations earlier in the day. This has resolved now.





Case discussed with ED physician Dr. Nuñez; previous records reviewed in EMR.





History Information





- Allergies/Home Medication List


Allergies/Adverse Reactions: 








iopamidol [From Isovue-M] Allergy (Severe, Verified 09/18/18 23:59)


 Anaphylaxis


lisinopril [Lisinopril] Allergy (Severe, Verified 09/18/18 23:59)


 Swelling/neck,face,throat


azithromycin [From Zithromax Z-Tomas] Allergy (Intermediate, Verified 09/18/18 23:

59)


 Rash


codeine [Codeine] Allergy (Intermediate, Verified 09/18/18 23:59)


 Vomiting


erythromycin base [Erythromycin Base] Allergy (Intermediate, Verified 09/18/18 

23:59)


 Other-Enter Comments


meperidine HCl [From Demerol] Allergy (Intermediate, Verified 09/18/18 23:59)


 Vomiting


Penicillins Allergy (Intermediate, Verified 09/18/18 23:59)


 Hives


everolimus [From Afinitor] Allergy (Verified 09/18/18 23:59)


 Hives


Iodine and Iodide Containing Produc Allergy (Verified 09/18/18 23:59)


 





Home Medications: 








Ascorbic Acid [Vitamin C 500 mg (*)] 1,000 mg PO DAILY 10/21/14 [Last Taken 10/

24/17]


Aspirin [Aspirin 81mg (*)] 81 mg PO DAILY 10/21/14 [Last Taken 10/24/17]


Fexofenadine HCl [Allegra Allergy] 60 mg PO DAILY PRN 10/21/14 [Last Taken 08/20

/15]


Levothyroxine [Synthroid 112 mcg (*)] 112 mcg PO DAILY06 10/21/14 [Last Taken 10

/24/17]


Multivitamins [Multivitamin (*)] 1 each PO DAILY 10/21/14 [Last Taken 10/24/17]


Omega-3 Fatty Acids [Fish Oil 1000 mg (*)] 1,000 mg PO DAILY 10/21/14 [Last 

Taken 10/24/17]


Calcium Carbonate [Oyster Shell Calcium 500 mg (*)] 500 mg PO DAILY 08/20/15 [

Last Taken 10/18/17]


Albuterol [Proventil Inhaler HFA (*)] 1 - 2 puffs IH DAILY PRN 10/25/17 [Last 

Taken Unknown]


Losartan/Hydrochlorothiazide [Hyzaar 100-12.5 Tablet] 1 each PO DAILY 10/25/17 [

Last Taken 10/24/17]


Xeloda  11/25/17 [Last Taken Unknown]


Zofran  11/25/17 [Last Taken Unknown]


Gemzar  09/18/18 [Last Taken Unknown]





I have personally reviewed and updated: family history, medical history





- Past Medical History


cancer (Metastatic breast (bone, liver)), hypertension





- Surgical History


Reports: hysterectomy





- Family History


Positive for: CAD


Additional family history: UC





- Social History


Smoking Status: Former smoker





Review of Systems


Review of Systems: 





ROS: 10pt was reviewed & negative except for what was stated in HPI & below





Physical Exam


Physical Exam: 

















Temp Pulse Resp BP Pulse Ox


 


 36.8 C   92   16   140/70 H  96 


 


 09/18/18 23:56  09/19/18 03:04  09/19/18 03:04  09/19/18 00:10  09/19/18 03:07











Constitutional: no apparent distress, not in pain


Eyes: PERRL, EOMI


Ears, Nose, Mouth, Throat: moist mucous membranes, no oral mucosal ulcers


Cardiovascular: regular rate and rhythym, no murmur, rub, or gallop


Respiratory: no respiratory distress, clear to auscultation


Gastrointestinal: normoactive bowel sounds, soft, non-tender abdomen, distension


Skin: warm, normal color, other (Port R upper chest)


Musculoskeletal: full muscle strength, no muscle tenderness


Neurologic: AAOx3, CN II-XII Intact


Psychiatric: interacting appropriately, not anxious





Lab Data & Imaging Review





 09/19/18 00:36





 09/19/18 00:36














WBC  5.87 10^3/uL (3.80-9.50)   09/19/18  00:36    


 


RBC  3.17 10^6/uL (4.18-5.33)  L  09/19/18  00:36    


 


Hgb  11.1 g/dL (12.6-16.3)  L  09/19/18  00:36    


 


Hct  30.5 % (38.0-47.0)  L  09/19/18  00:36    


 


MCV  96.2 fL (81.5-99.8)   09/19/18  00:36    


 


MCH  35.0 pg (27.9-34.1)  H  09/19/18  00:36    


 


MCHC  36.4 g/dL (32.4-36.7)   09/19/18  00:36    


 


RDW  22.6 % (11.5-15.2)  H  09/19/18  00:36    


 


Plt Count  187 10^3/uL (150-400)   09/19/18  00:36    


 


MPV  10.4 fL (8.7-11.7)   09/19/18  00:36    


 


Neut % (Auto)  49.1 % (39.3-74.2)   09/19/18  00:36    


 


Lymph % (Auto)  30.3 % (15.0-45.0)   09/19/18  00:36    


 


Mono % (Auto)  17.7 % (4.5-13.0)  H  09/19/18  00:36    


 


Eos % (Auto)  1.0 % (0.6-7.6)   09/19/18  00:36    


 


Baso % (Auto)  0.9 % (0.3-1.7)   09/19/18  00:36    


 


Nucleat RBC Rel Count  1.4 % (0.0-0.2)  H  09/19/18  00:36    


 


Absolute Neuts (auto)  2.88 10^3/uL (1.70-6.50)   09/19/18  00:36    


 


Absolute Lymphs (auto)  1.78 10^3/uL (1.00-3.00)   09/19/18  00:36    


 


Absolute Monos (auto)  1.04 10^3/uL (0.30-0.80)  H  09/19/18  00:36    


 


Absolute Eos (auto)  0.06 10^3/uL (0.03-0.40)   09/19/18  00:36    


 


Absolute Basos (auto)  0.05 10^3/uL (0.02-0.10)   09/19/18  00:36    


 


Absolute Nucleated RBC  0.08 10^3/uL (0-0.01)  H  09/19/18  00:36    


 


Immature Gran %  1.0 % (0.0-1.1)   09/19/18  00:36    


 


Immature Gran #  0.06 10^3/uL (0.00-0.10)   09/19/18  00:36    


 


Platelet Estimate  ADEQUATE  (ADEQ)   09/19/18  00:36    


 


Polychromasia  1+  H  09/19/18  00:36    


 


Oval Macrocytes  1+  H  09/19/18  00:36    


 


PT  15.8 SEC (12.0-15.0)  H  09/19/18  00:36    


 


INR  1.24  (0.83-1.16)  H  09/19/18  00:36    


 


APTT  29.3 SEC (23.0-38.0)   09/19/18  00:36    


 


VBG Lactic Acid  1.8 mmol/L (0.7-2.1)   09/19/18  00:36    


 


Sodium  132 mEq/L (135-145)  L  09/19/18  00:36    


 


Potassium  3.5 mEq/L (3.3-5.0)   09/19/18  00:36    


 


Chloride  100 mEq/L ()   09/19/18  00:36    


 


Carbon Dioxide  23 mEq/l (22-31)   09/19/18  00:36    


 


Anion Gap  9 mEq/L (8-16)   09/19/18  00:36    


 


BUN  9 mg/dL (7-23)   09/19/18  00:36    


 


Creatinine  0.7 mg/dL (0.6-1.0)   09/19/18  00:36    


 


Estimated GFR  > 60   09/19/18  00:36    


 


Glucose  126 mg/dL ()  H  09/19/18  00:36    


 


Calcium  9.4 mg/dL (8.5-10.4)   09/19/18  00:36    


 


Total Bilirubin  2.6 mg/dL (0.1-1.4)  H  09/19/18  00:36    


 


Conjugated Bilirubin  1.5 mg/dL (0.0-0.5)  H  09/19/18  00:36    


 


Unconjugated Bilirubin  1.1 mg/dL (0.0-1.1)   09/19/18  00:36    


 


AST  164 IU/L (14-46)  H  09/19/18  00:36    


 


ALT  87 IU/L (9-52)  H  09/19/18  00:36    


 


Alkaline Phosphatase  338 IU/L ()  H  09/19/18  00:36    


 


POC Troponin I  0.01 ng/mL (0.00-0.08)   09/19/18  00:37    


 


Total Protein  5.2 g/dL (6.3-8.2)  L  09/19/18  00:36    


 


Albumin  2.8 g/dL (3.5-5.0)  L  09/19/18  00:36    


 


Lipase  100 IU/L ()   09/19/18  00:36    








Imaging Review: 


U/S Prelim:





DIFFUSE LIVER METS 





MILD ASCITIS 





NO GALLSTONES 





DISCUSSED WITH ARTURO AT 0125





CT prelim:





DIFFUSE LIVER METS AND CIRRHOSIS 





DIFFUSE OSSEOUS METS 





SMALL TO MOD ASCITES 





CONSTIPATION 





NO OBSTRUCTION 





DISCUSSED WITH DR MORRIS AT 0250  





Assessment & Plan


Assessment: 








77 yo F w/ metastatic breast CA, HTN, and hypothyroidism presents with 

abdominal pain.





Plan: 


1. Abdominal pain - Unclear etiology; likely multifactorial form ascites, liver 

metastases, constipation, and possibly gastritis/PUD noting NSAID use at home 

for pain relief. Work-up in ED was negative for gallstones, cholecystitis, and 

obstruction. Abdominal exam is reassuring and she feels better after pain 

medication. 


- Admit for observation


- Dilaudid PO/IV for pain control (states this is one of few opiates that she 

tolerates)


- Will start PPI


- If pain is persistent or worsens, would be reasonable to rule out SBO and PVT


2. Chest pain - Patient had brief chest tightness earlier in the day prior to 

abdominal pain. I feel it is unlikely this is cardiac in origin. She had a 

normal TTE in 2017 and normal LHC in 2014. She has no prior cardiac history.


- Monitor on telemetry overnight, trend cardiac enzymes


- If above unremarkable, no further work-up indicated


3. Metastatic breast CA - With metastasis to liver and bone; last chemotherapy 

on 9/6. Per patient, disease is progressing at this time despite treatment.


- Followed at [facility]


4. HTN - Continue home medications pending reconciliation


5. Hypothyroid - Continue LTX





Diet - Regular


Code - Full


Ppx - LMWH


Dispo - Admit under observation status

## 2018-09-19 NOTE — CPEKG
Test Reason : OPEN

Blood Pressure : ***/*** mmHG

Vent. Rate : 089 BPM     Atrial Rate : 090 BPM

   P-R Int : 140 ms          QRS Dur : 092 ms

    QT Int : 370 ms       P-R-T Axes : 080 054 072 degrees

   QTc Int : 451 ms

 

Sinus rhythm

 

Confirmed by Wil Ponce ([id number]) on 9/19/2018 7:21:49 AM

 

Referred By:             Confirmed By:Wil Ponce